# Patient Record
Sex: FEMALE | Race: ASIAN | URBAN - METROPOLITAN AREA
[De-identification: names, ages, dates, MRNs, and addresses within clinical notes are randomized per-mention and may not be internally consistent; named-entity substitution may affect disease eponyms.]

---

## 2017-01-06 LAB
ABO + RH BLD: NORMAL
ABO + RH BLD: NORMAL
HBV SURFACE AG SERPL QL IA: NEGATIVE
HIV 1+2 AB+HIV1 P24 AG SERPL QL IA: NORMAL
RUBELLA ANTIBODY IGG QUANTITATIVE: NORMAL IU/ML
T PALLIDUM IGG SER QL: NORMAL

## 2017-07-07 LAB — GROUP B STREP PCR: NEGATIVE

## 2017-08-06 ENCOUNTER — HOSPITAL ENCOUNTER (INPATIENT)
Facility: CLINIC | Age: 27
LOS: 2 days | Discharge: HOME | End: 2017-08-08
Attending: SPECIALIST | Admitting: OBSTETRICS & GYNECOLOGY
Payer: COMMERCIAL

## 2017-08-06 ENCOUNTER — ANESTHESIA EVENT (OUTPATIENT)
Dept: OBGYN | Facility: CLINIC | Age: 27
End: 2017-08-06
Payer: COMMERCIAL

## 2017-08-06 ENCOUNTER — ANESTHESIA (OUTPATIENT)
Dept: OBGYN | Facility: CLINIC | Age: 27
End: 2017-08-06
Payer: COMMERCIAL

## 2017-08-06 LAB
ABO + RH BLD: NORMAL
ABO + RH BLD: NORMAL
ERYTHROCYTE [DISTWIDTH] IN BLOOD BY AUTOMATED COUNT: 13.5 % (ref 10–15)
HCT VFR BLD AUTO: 38.7 % (ref 35–47)
HGB BLD-MCNC: 13.5 G/DL (ref 11.7–15.7)
MCH RBC QN AUTO: 33 PG (ref 26.5–33)
MCHC RBC AUTO-ENTMCNC: 34.9 G/DL (ref 31.5–36.5)
MCV RBC AUTO: 95 FL (ref 78–100)
PLATELET # BLD AUTO: 143 10E9/L (ref 150–450)
RBC # BLD AUTO: 4.09 10E12/L (ref 3.8–5.2)
SPECIMEN EXP DATE BLD: NORMAL
WBC # BLD AUTO: 11 10E9/L (ref 4–11)

## 2017-08-06 PROCEDURE — 99215 OFFICE O/P EST HI 40 MIN: CPT | Mod: 25

## 2017-08-06 PROCEDURE — 86901 BLOOD TYPING SEROLOGIC RH(D): CPT | Performed by: SPECIALIST

## 2017-08-06 PROCEDURE — 86900 BLOOD TYPING SEROLOGIC ABO: CPT | Performed by: SPECIALIST

## 2017-08-06 PROCEDURE — 3E0R3CZ INTRODUCTION OF REGIONAL ANESTHETIC INTO SPINAL CANAL, PERCUTANEOUS APPROACH: ICD-10-PCS | Performed by: ANESTHESIOLOGY

## 2017-08-06 PROCEDURE — 36415 COLL VENOUS BLD VENIPUNCTURE: CPT | Performed by: SPECIALIST

## 2017-08-06 PROCEDURE — 25000128 H RX IP 250 OP 636: Performed by: ANESTHESIOLOGY

## 2017-08-06 PROCEDURE — 72200001 ZZH LABOR CARE VAGINAL DELIVERY SINGLE

## 2017-08-06 PROCEDURE — 10907ZC DRAINAGE OF AMNIOTIC FLUID, THERAPEUTIC FROM PRODUCTS OF CONCEPTION, VIA NATURAL OR ARTIFICIAL OPENING: ICD-10-PCS | Performed by: OBSTETRICS & GYNECOLOGY

## 2017-08-06 PROCEDURE — 59025 FETAL NON-STRESS TEST: CPT

## 2017-08-06 PROCEDURE — 37000011 ZZH ANESTHESIA WARD SERVICE

## 2017-08-06 PROCEDURE — 00HU33Z INSERTION OF INFUSION DEVICE INTO SPINAL CANAL, PERCUTANEOUS APPROACH: ICD-10-PCS | Performed by: ANESTHESIOLOGY

## 2017-08-06 PROCEDURE — 12000029 ZZH R&B OB INTERMEDIATE

## 2017-08-06 PROCEDURE — 25000125 ZZHC RX 250: Performed by: OBSTETRICS & GYNECOLOGY

## 2017-08-06 PROCEDURE — 85027 COMPLETE CBC AUTOMATED: CPT | Performed by: SPECIALIST

## 2017-08-06 PROCEDURE — 86780 TREPONEMA PALLIDUM: CPT | Performed by: SPECIALIST

## 2017-08-06 PROCEDURE — 25000125 ZZHC RX 250: Performed by: SPECIALIST

## 2017-08-06 PROCEDURE — 25000128 H RX IP 250 OP 636: Performed by: SPECIALIST

## 2017-08-06 PROCEDURE — 0KQM0ZZ REPAIR PERINEUM MUSCLE, OPEN APPROACH: ICD-10-PCS | Performed by: OBSTETRICS & GYNECOLOGY

## 2017-08-06 RX ORDER — OXYTOCIN/0.9 % SODIUM CHLORIDE 30/500 ML
1-24 PLASTIC BAG, INJECTION (ML) INTRAVENOUS CONTINUOUS
Status: DISCONTINUED | OUTPATIENT
Start: 2017-08-06 | End: 2017-08-06

## 2017-08-06 RX ORDER — OXYTOCIN 10 [USP'U]/ML
10 INJECTION, SOLUTION INTRAMUSCULAR; INTRAVENOUS
Status: COMPLETED | OUTPATIENT
Start: 2017-08-06 | End: 2017-08-06

## 2017-08-06 RX ORDER — NALBUPHINE HYDROCHLORIDE 10 MG/ML
2.5-5 INJECTION, SOLUTION INTRAMUSCULAR; INTRAVENOUS; SUBCUTANEOUS EVERY 6 HOURS PRN
Status: DISCONTINUED | OUTPATIENT
Start: 2017-08-06 | End: 2017-08-06

## 2017-08-06 RX ORDER — ROPIVACAINE HYDROCHLORIDE 2 MG/ML
10 INJECTION, SOLUTION EPIDURAL; INFILTRATION; PERINEURAL ONCE
Status: DISCONTINUED | OUTPATIENT
Start: 2017-08-06 | End: 2017-08-06

## 2017-08-06 RX ORDER — NALOXONE HYDROCHLORIDE 0.4 MG/ML
.1-.4 INJECTION, SOLUTION INTRAMUSCULAR; INTRAVENOUS; SUBCUTANEOUS
Status: DISCONTINUED | OUTPATIENT
Start: 2017-08-06 | End: 2017-08-08 | Stop reason: HOSPADM

## 2017-08-06 RX ORDER — BISACODYL 10 MG
10 SUPPOSITORY, RECTAL RECTAL DAILY PRN
Status: DISCONTINUED | OUTPATIENT
Start: 2017-08-08 | End: 2017-08-08 | Stop reason: HOSPADM

## 2017-08-06 RX ORDER — MISOPROSTOL 200 UG/1
400 TABLET ORAL
Status: DISCONTINUED | OUTPATIENT
Start: 2017-08-06 | End: 2017-08-08 | Stop reason: HOSPADM

## 2017-08-06 RX ORDER — CARBOPROST TROMETHAMINE 250 UG/ML
250 INJECTION, SOLUTION INTRAMUSCULAR
Status: COMPLETED | OUTPATIENT
Start: 2017-08-06 | End: 2017-08-06

## 2017-08-06 RX ORDER — OMEGA-3-ACID ETHYL ESTERS 1 G/1
2 CAPSULE, LIQUID FILLED ORAL 2 TIMES DAILY
COMMUNITY

## 2017-08-06 RX ORDER — ACETAMINOPHEN 325 MG/1
650 TABLET ORAL EVERY 4 HOURS PRN
Status: DISCONTINUED | OUTPATIENT
Start: 2017-08-06 | End: 2017-08-06

## 2017-08-06 RX ORDER — OXYTOCIN/0.9 % SODIUM CHLORIDE 30/500 ML
100-340 PLASTIC BAG, INJECTION (ML) INTRAVENOUS CONTINUOUS PRN
Status: COMPLETED | OUTPATIENT
Start: 2017-08-06 | End: 2017-08-06

## 2017-08-06 RX ORDER — LANOLIN 100 %
OINTMENT (GRAM) TOPICAL
Status: DISCONTINUED | OUTPATIENT
Start: 2017-08-06 | End: 2017-08-08 | Stop reason: HOSPADM

## 2017-08-06 RX ORDER — OXYTOCIN/0.9 % SODIUM CHLORIDE 30/500 ML
100 PLASTIC BAG, INJECTION (ML) INTRAVENOUS CONTINUOUS
Status: DISCONTINUED | OUTPATIENT
Start: 2017-08-06 | End: 2017-08-08 | Stop reason: HOSPADM

## 2017-08-06 RX ORDER — EPHEDRINE SULFATE 50 MG/ML
5 INJECTION, SOLUTION INTRAMUSCULAR; INTRAVENOUS; SUBCUTANEOUS
Status: DISCONTINUED | OUTPATIENT
Start: 2017-08-06 | End: 2017-08-06

## 2017-08-06 RX ORDER — NALOXONE HYDROCHLORIDE 0.4 MG/ML
.1-.4 INJECTION, SOLUTION INTRAMUSCULAR; INTRAVENOUS; SUBCUTANEOUS
Status: DISCONTINUED | OUTPATIENT
Start: 2017-08-06 | End: 2017-08-06

## 2017-08-06 RX ORDER — LIDOCAINE 40 MG/G
CREAM TOPICAL
Status: DISCONTINUED | OUTPATIENT
Start: 2017-08-06 | End: 2017-08-06

## 2017-08-06 RX ORDER — AMOXICILLIN 250 MG
1-2 CAPSULE ORAL 2 TIMES DAILY
Status: DISCONTINUED | OUTPATIENT
Start: 2017-08-06 | End: 2017-08-08 | Stop reason: HOSPADM

## 2017-08-06 RX ORDER — OXYTOCIN/0.9 % SODIUM CHLORIDE 30/500 ML
340 PLASTIC BAG, INJECTION (ML) INTRAVENOUS CONTINUOUS PRN
Status: DISCONTINUED | OUTPATIENT
Start: 2017-08-06 | End: 2017-08-08 | Stop reason: HOSPADM

## 2017-08-06 RX ORDER — BUPIVACAINE HYDROCHLORIDE 2.5 MG/ML
INJECTION, SOLUTION EPIDURAL; INFILTRATION; INTRACAUDAL
Status: DISCONTINUED
Start: 2017-08-06 | End: 2017-08-07 | Stop reason: HOSPADM

## 2017-08-06 RX ORDER — IBUPROFEN 800 MG/1
800 TABLET, FILM COATED ORAL
Status: DISCONTINUED | OUTPATIENT
Start: 2017-08-06 | End: 2017-08-06

## 2017-08-06 RX ORDER — OXYCODONE AND ACETAMINOPHEN 5; 325 MG/1; MG/1
1 TABLET ORAL
Status: DISCONTINUED | OUTPATIENT
Start: 2017-08-06 | End: 2017-08-06

## 2017-08-06 RX ORDER — OXYTOCIN 10 [USP'U]/ML
10 INJECTION, SOLUTION INTRAMUSCULAR; INTRAVENOUS
Status: DISCONTINUED | OUTPATIENT
Start: 2017-08-06 | End: 2017-08-08 | Stop reason: HOSPADM

## 2017-08-06 RX ORDER — SODIUM CHLORIDE, SODIUM LACTATE, POTASSIUM CHLORIDE, CALCIUM CHLORIDE 600; 310; 30; 20 MG/100ML; MG/100ML; MG/100ML; MG/100ML
INJECTION, SOLUTION INTRAVENOUS CONTINUOUS
Status: DISCONTINUED | OUTPATIENT
Start: 2017-08-06 | End: 2017-08-06

## 2017-08-06 RX ORDER — METHYLERGONOVINE MALEATE 0.2 MG/ML
200 INJECTION INTRAVENOUS
Status: DISCONTINUED | OUTPATIENT
Start: 2017-08-06 | End: 2017-08-06

## 2017-08-06 RX ORDER — ONDANSETRON 2 MG/ML
4 INJECTION INTRAMUSCULAR; INTRAVENOUS EVERY 6 HOURS PRN
Status: DISCONTINUED | OUTPATIENT
Start: 2017-08-06 | End: 2017-08-06

## 2017-08-06 RX ORDER — IBUPROFEN 600 MG/1
600 TABLET, FILM COATED ORAL EVERY 6 HOURS PRN
Status: DISCONTINUED | OUTPATIENT
Start: 2017-08-06 | End: 2017-08-08 | Stop reason: HOSPADM

## 2017-08-06 RX ORDER — PRENATAL VIT/IRON FUM/FOLIC AC 27MG-0.8MG
1 TABLET ORAL DAILY
COMMUNITY

## 2017-08-06 RX ORDER — HYDROCORTISONE 2.5 %
CREAM (GRAM) TOPICAL 3 TIMES DAILY PRN
Status: DISCONTINUED | OUTPATIENT
Start: 2017-08-06 | End: 2017-08-08 | Stop reason: HOSPADM

## 2017-08-06 RX ORDER — FENTANYL CITRATE 50 UG/ML
100 INJECTION, SOLUTION INTRAMUSCULAR; INTRAVENOUS ONCE
Status: DISCONTINUED | OUTPATIENT
Start: 2017-08-06 | End: 2017-08-06

## 2017-08-06 RX ORDER — ACETAMINOPHEN 325 MG/1
975 TABLET ORAL EVERY 6 HOURS PRN
Status: DISCONTINUED | OUTPATIENT
Start: 2017-08-06 | End: 2017-08-08 | Stop reason: HOSPADM

## 2017-08-06 RX ADMIN — SODIUM CHLORIDE, POTASSIUM CHLORIDE, SODIUM LACTATE AND CALCIUM CHLORIDE 1000 ML: 600; 310; 30; 20 INJECTION, SOLUTION INTRAVENOUS at 07:10

## 2017-08-06 RX ADMIN — CARBOPROST TROMETHAMINE 250 MCG: 250 INJECTION, SOLUTION INTRAMUSCULAR at 21:56

## 2017-08-06 RX ADMIN — LIDOCAINE HYDROCHLORIDE 20 ML: 10 INJECTION, SOLUTION INFILTRATION; PERINEURAL at 22:00

## 2017-08-06 RX ADMIN — Medication 800 TABLET: at 22:05

## 2017-08-06 RX ADMIN — SODIUM CHLORIDE, POTASSIUM CHLORIDE, SODIUM LACTATE AND CALCIUM CHLORIDE: 600; 310; 30; 20 INJECTION, SOLUTION INTRAVENOUS at 13:27

## 2017-08-06 RX ADMIN — Medication 12 ML/HR: at 08:28

## 2017-08-06 RX ADMIN — OXYTOCIN-SODIUM CHLORIDE 0.9% IV SOLN 30 UNIT/500ML 2 MILLI-UNITS/MIN: 30-0.9/5 SOLUTION at 17:20

## 2017-08-06 RX ADMIN — OXYTOCIN-SODIUM CHLORIDE 0.9% IV SOLN 30 UNIT/500ML 340 ML/HR: 30-0.9/5 SOLUTION at 22:00

## 2017-08-06 RX ADMIN — FENTANYL CITRATE 100 MCG: 50 INJECTION, SOLUTION INTRAMUSCULAR; INTRAVENOUS at 08:12

## 2017-08-06 RX ADMIN — Medication 100 ML/HR: at 22:30

## 2017-08-06 RX ADMIN — OXYTOCIN-SODIUM CHLORIDE 0.9% IV SOLN 30 UNIT/500ML 10 MILLI-UNITS/MIN: 30-0.9/5 SOLUTION at 21:00

## 2017-08-06 RX ADMIN — OXYTOCIN 10 UNITS: 10 INJECTION, SOLUTION INTRAMUSCULAR; INTRAVENOUS at 21:58

## 2017-08-06 RX ADMIN — SODIUM CHLORIDE, POTASSIUM CHLORIDE, SODIUM LACTATE AND CALCIUM CHLORIDE: 600; 310; 30; 20 INJECTION, SOLUTION INTRAVENOUS at 07:55

## 2017-08-06 RX ADMIN — ROPIVACAINE HYDROCHLORIDE 10 ML: 2 INJECTION, SOLUTION EPIDURAL; INFILTRATION at 08:12

## 2017-08-06 NOTE — IP AVS SNAPSHOT
MRN:[id number]                      After Visit Summary   8/6/2017    Bridgett Gillespie    MRN: [id number]           Thank you!     Thank you for choosing [facility] for your care. Our goal is always to provide you with excellent care. Hearing back from our patients is one way we can continue to improve our services. Please take a few minutes to complete the written survey that you may receive in the mail after you visit with us. Thank you!        Patient Information     Date Of Birth          1990        About your hospital stay     You were admitted on:  August 6, 2017 You last received care in the:  [address]    You were discharged on:  August 8, 2017       Who to Call     For medical emergencies, please call 911.  For non-urgent questions about your medical care, please call your primary care provider or clinic, [phone]          Attending Provider     Provider Specialty    Lissette Londono MD OB/Gyn    Buck, Michelle Cody MD OB/Gyn       Primary Care Provider Office Phone # Fax #    Holley Jeremias Al [phone]      After Care Instructions     Activity       Review discharge instructions            Diet       Resume previous diet            Discharge Instructions - Postpartum visit       Schedule postpartum visit with your provider and return to clinic in 2 weeks.  Take iron supplements BID, docusate, use TUCKs as needed                  Further instructions from your care team       Postpartum Vaginal Delivery Instructions    Activity       Ask family and friends for help when you need it.    Do not place anything in your vagina for 6 weeks.    You are not restricted on other activities, but take it easy for a few weeks to allow your body to recover from delivery.  You are able to do any activities you feel up to that point.    No driving until you have stopped taking your pain medications (usually two weeks after  delivery).     Call your health care provider if you have any of these symptoms:       Increased pain, swelling, redness, or fluid around your stiches from an episiotomy or perineal tear.    A fever above 100.4 F (38 C) with or without chills when placing a thermometer under your tongue.    You soak a sanitary pad with blood within 1 hour, or you see blood clots larger than a golf ball.    Bleeding that lasts more than 6 weeks.    Vaginal discharge that smells bad.    Severe pain, cramping or tenderness in your lower belly area.    A need to urinate more frequently (use the toilet more often), more urgently (use the toilet very quickly), or it burns when you urinate.    Nausea and vomiting.    Redness, swelling or pain around a vein in your leg.    Problems breastfeeding or a red or painful area on your breast.    Chest pain and cough or are gasping for air.    Problems coping with sadness, anxiety, or depression.  If you have any concerns about hurting yourself or the baby, call your provider immediately.     You have questions or concerns after you return home.     Keep your hands clean:  Always wash your hands before touching your perineal area and stitches.  This helps reduce your risk of infection.  If your hands aren't dirty, you may use an alcohol hand-rub to clean your hands. Keep your nails clean and short.        Pending Results     Date and Time Order Name Status Description    8/7/2017 1125 EKG 12-lead, tracing only Preliminary             Statement of Approval     Ordered          08/08/17 0814  I have reviewed and agree with all the recommendations and orders detailed in this document.  EFFECTIVE NOW     Approved and electronically signed by:  Holley Al MD             Admission Information     Date & Time Provider Department Dept. Phone    8/6/2017 Michelle Nunez MD [address] [phone]      Your Vitals Were     Blood Pressure Pulse Temperature  "Respirations Pulse Oximetry       99/64 (BP Location: Right arm) 117 97.2  F (36.2  C) (Oral) 16 96%       MyChart Information     [facility] lets you send messages to your doctor, view your test results, renew your prescriptions, schedule appointments and more. To sign up, go to [email or web address] . Click on \"Log in\" on the left side of the screen, which will take you to the Welcome page. Then click on \"Sign up Now\" on the right side of the page.     You will be asked to enter the access code listed below, as well as some personal information. Please follow the directions to create your username and password.     Your access code is: [email or web address]  Expires: 2017  1:28 PM     Your access code will  in 90 days. If you need help or a new code, please call your [facility] clinic or [phone].        Care EveryWhere ID     This is your Care EveryWhere ID. This could be used by other organizations to access your [facility] medical records  [id number]        Equal Access to Services     [facility] : Hadii rubio Lozano, wajames ahmadi, qasuyapa rodríguezalfelisa man, cammie davey . So [facility] [place] [phone].    ATENCIÓN: Si habla español, tiene a teran disposición servicios gratuitos de asistencia lingüística. Kelby al [phone].    We comply with applicable federal civil rights laws and [place] laws. We do not discriminate on the basis of race, color, national origin, age, disability sex, sexual orientation or gender identity.               Review of your medicines      CONTINUE these medicines which have NOT CHANGED        Dose / Directions    omega-3 acid ethyl esters 1 G capsule   Commonly known as:  Lovaza        Dose:  2 g   Take 2 g by mouth 2 times daily   Refills:  0       prenatal multivitamin plus iron 27-0.8 MG Tabs per tablet   Indication:  Pregnancy        Dose:  1 tablet   Take 1 tablet by mouth daily   Refills:  0       VITAMIN D (CHOLECALCIFEROL) PO        " Dose:  2000 Units   Take 2,000 Units by mouth daily   Refills:  0                Protect others around you: Learn how to safely use, store and throw away your medicines at www.disposemymeds.org.             Medication List: This is a list of all your medications and when to take them. Check marks below indicate your daily home schedule. Keep this list as a reference.      Medications           Morning Afternoon Evening Bedtime As Needed    omega-3 acid ethyl esters 1 G capsule   Commonly known as:  Lovaza   Take 2 g by mouth 2 times daily                                prenatal multivitamin plus iron 27-0.8 MG Tabs per tablet   Take 1 tablet by mouth daily                                VITAMIN D (CHOLECALCIFEROL) PO   Take 2,000 Units by mouth daily

## 2017-08-06 NOTE — IP AVS SNAPSHOT
[address]    [place] [address]    Phone:  [phone]                                       After Visit Summary   8/6/2017    Bridgett Gillespie    MRN: [id number]           After Visit Summary Signature Page     I have received my discharge instructions, and my questions have been answered. I have discussed any challenges I see with this plan with the nurse or doctor.    ..........................................................................................................................................  Patient/Patient Representative Signature      ..........................................................................................................................................  Patient Representative Print Name and Relationship to Patient    ..................................................               ................................................  Date                                            Time    ..........................................................................................................................................  Reviewed by Signature/Title    ...................................................              ..............................................  Date                                                            Time

## 2017-08-06 NOTE — PLAN OF CARE
0615: Primip, 40+2 weeks, presents to [facility] for labor evaluation. She has been rogelio every 15 minutes since last evening, with back pain. Denies bleeding or leaking of fluid. Patient speaks english- but primary language is Patricio- declines  at this time. Uncomplicated pregnancy, GBS negative. Monitors applied.   0645: Dr. Londono updated by phone re: contractions every 5-6 minutes, SVE with good change from the office exam, bloody show, intact. Discussed patient's tachycardia- no cardiac history. Will monitor with pulse ox to distinguish between FHT's.   0655: To room #231  0715: Report to JUAN JOSE Barrientos RN. #18g IV started in left hand.

## 2017-08-06 NOTE — H&P
OB ADMISSION NOTE    CHIEF COMPLAINT: Painful contractions  Active Problems:    Indication for care in labor or delivery      OBSTETRICAL / DATING HISTORY:  Estimated Date of Delivery: Aug 4, 2017  Gestational Age:  40w2d    Obstetric History       T0      L0     SAB0   TAB0   Ectopic0   Multiple0   Live Births0       # Outcome Date GA Lbr Ken/2nd Weight Sex Delivery Anes PTL Lv   2 Current            1 SAB  12w0d                     PAST MEDICAL HISTORY:  History reviewed. No pertinent past medical history.     PAST SURGICAL HISTORY:  History reviewed. No pertinent surgical history.     FAMILY HISTORY:  No family history on file.      ALLERGIES:   No Known Allergies     HABITS:  Social History     Social History     Marital status:      Spouse name: N/A     Number of children: N/A     Years of education: N/A     Occupational History     Not on file.     Social History Main Topics     Smoking status: Never Smoker     Smokeless tobacco: Never Used     Alcohol use No     Drug use: No     Sexual activity: Not on file     Other Topics Concern     Not on file     Social History Narrative        HISTORY OF PRESENT ILLNESS:    (Please see scanned  sheets for prenatal history. Examination at the time of admission revealed no interval change in the patient s history or physical exam except as described below.)    26 year old  at 40w2d presented in active labor. Now comfortable with epidural.     Uncomplicated pregnancy, otherwise healthy    REVIEW OF SYSTEMS:  Negative except per HPI    PHYSICAL EXAM:   Patient Vitals for the past 8 hrs:   BP Temp Resp SpO2   17 0915 125/78 - 18 99 %   17 0900 122/76 97.5  F (36.4  C) - 100 %   17 0840 130/85 - - 100 %   17 0830 125/77 - 18 100 %   17 0820 132/80 - 18 100 %   17 0800 - - 18 99 %   17 0730 132/82 98.8  F (37.1  C) 18 100 %   17 0715 134/87 - 18 99 %   17 0615 124/83 98.1  F (36.7  C) -  -     Gen: NAD, lying in bed  Abd: gravid    SVE: 90/0  Membrane Status: AROM to small amount fluid mixed with bloody show  Fetal Presentation: cephalic   EFW: 7.5 lbs    EFM & [place]: category 1, contractions q2-4min    Recent Labs   Lab Test  17   0850  07/08/15   0852   HGB  13.5  14.1   PLT  143*  236       Assessment/Plan: 26 year old  at 40w2d here in labor  -admit to L&D  -expectant management, AROM'd at 0940  -pain management: comfortable with epidural  -maternal tachycardia: monitor  Dispo: pending course      Michelle Nunez   2017   Pager: [phone]

## 2017-08-07 LAB
ANION GAP SERPL CALCULATED.3IONS-SCNC: 9 MMOL/L (ref 3–14)
BUN SERPL-MCNC: 4 MG/DL (ref 7–30)
CALCIUM SERPL-MCNC: 8.4 MG/DL (ref 8.5–10.1)
CHLORIDE SERPL-SCNC: 108 MMOL/L (ref 94–109)
CO2 SERPL-SCNC: 24 MMOL/L (ref 20–32)
CREAT SERPL-MCNC: 0.52 MG/DL (ref 0.52–1.04)
GFR SERPL CREATININE-BSD FRML MDRD: ABNORMAL ML/MIN/1.7M2
GLUCOSE SERPL-MCNC: 111 MG/DL (ref 70–99)
HGB BLD-MCNC: 10.1 G/DL (ref 11.7–15.7)
HGB BLD-MCNC: 8.6 G/DL (ref 11.7–15.7)
POTASSIUM SERPL-SCNC: 3.2 MMOL/L (ref 3.4–5.3)
SODIUM SERPL-SCNC: 141 MMOL/L (ref 133–144)
T PALLIDUM IGG+IGM SER QL: NEGATIVE
TSH SERPL DL<=0.005 MIU/L-ACNC: 3.14 MU/L (ref 0.4–4)

## 2017-08-07 PROCEDURE — 85018 HEMOGLOBIN: CPT | Performed by: PHYSICIAN ASSISTANT

## 2017-08-07 PROCEDURE — 84443 ASSAY THYROID STIM HORMONE: CPT | Performed by: SPECIALIST

## 2017-08-07 PROCEDURE — 93005 ELECTROCARDIOGRAM TRACING: CPT

## 2017-08-07 PROCEDURE — 12000037 ZZH R&B POSTPARTUM INTERMEDIATE

## 2017-08-07 PROCEDURE — 25000128 H RX IP 250 OP 636: Performed by: PHYSICIAN ASSISTANT

## 2017-08-07 PROCEDURE — 80048 BASIC METABOLIC PNL TOTAL CA: CPT | Performed by: PHYSICIAN ASSISTANT

## 2017-08-07 PROCEDURE — 93010 ELECTROCARDIOGRAM REPORT: CPT | Performed by: INTERNAL MEDICINE

## 2017-08-07 PROCEDURE — 99207 ZZC CONSULT E&M CHANGED TO INITIAL LEVEL: CPT | Performed by: PHYSICIAN ASSISTANT

## 2017-08-07 PROCEDURE — 36415 COLL VENOUS BLD VENIPUNCTURE: CPT | Performed by: SPECIALIST

## 2017-08-07 PROCEDURE — 99222 1ST HOSP IP/OBS MODERATE 55: CPT | Performed by: PHYSICIAN ASSISTANT

## 2017-08-07 PROCEDURE — 85018 HEMOGLOBIN: CPT | Performed by: OBSTETRICS & GYNECOLOGY

## 2017-08-07 PROCEDURE — 36415 COLL VENOUS BLD VENIPUNCTURE: CPT | Performed by: PHYSICIAN ASSISTANT

## 2017-08-07 PROCEDURE — 25000132 ZZH RX MED GY IP 250 OP 250 PS 637: Performed by: OBSTETRICS & GYNECOLOGY

## 2017-08-07 RX ORDER — FENTANYL CITRATE 50 UG/ML
INJECTION, SOLUTION INTRAMUSCULAR; INTRAVENOUS PRN
Status: DISCONTINUED | OUTPATIENT
Start: 2017-08-06 | End: 2017-08-07 | Stop reason: HOSPADM

## 2017-08-07 RX ORDER — ROPIVACAINE HYDROCHLORIDE 2 MG/ML
INJECTION, SOLUTION EPIDURAL; INFILTRATION; PERINEURAL PRN
Status: DISCONTINUED | OUTPATIENT
Start: 2017-08-06 | End: 2017-08-07 | Stop reason: HOSPADM

## 2017-08-07 RX ADMIN — SENNOSIDES AND DOCUSATE SODIUM 1 TABLET: 8.6; 5 TABLET ORAL at 20:06

## 2017-08-07 RX ADMIN — SENNOSIDES AND DOCUSATE SODIUM 1 TABLET: 8.6; 5 TABLET ORAL at 09:29

## 2017-08-07 RX ADMIN — IBUPROFEN 600 MG: 600 TABLET ORAL at 17:34

## 2017-08-07 RX ADMIN — IBUPROFEN 600 MG: 600 TABLET ORAL at 09:30

## 2017-08-07 RX ADMIN — SODIUM CHLORIDE 1000 ML: 9 INJECTION, SOLUTION INTRAVENOUS at 13:37

## 2017-08-07 NOTE — CONSULTS
PRIMARY CARE  PHYSICIAN:  Holley Al MD      REASON FOR CONSULTATION:  Persistent tachycardia.      PHYSICIAN REQUESTING CONSULTATION:  Dr. Castelan.      HISTORY OF PRESENT ILLNESS:  Bridgett Gillespie is a 26-year-old otherwise healthy female who is 1 day postpartum from delivering a healthy.  The patient had an uneventful and uncomplicated pregnancy.  She was noted to be tachycardic on arrival to a heart rate of 140 in sinus rhythm.  Postpartum she has been persistently tachycardic to a range of 115-120 beats per minute and therefore hospitalist consultation was requested.      The patient is presently evaluated in hospital room with family at bedside who assists with history.  She presently denies any shortness of breath, difficulty breathing, chest pain, chest discomfort, no nausea, no vomiting, no indigestion, no abdominal pain or discomfort.  She has not had any fevers.  She has been ambulating around the room with minimal difficulty.  Given that she is 1 day postpartum, she does have still some irritation in the perineal area, but she is managing well.  She has not required a large amount of narcotic pain medication.  Per nursing staff, she is not bleeding a worrisome amount.      PAST MEDICAL HISTORY:   1.  History of vitamin D deficiency.   2.  Spontaneous  at 12 weeks.      PAST SURGICAL HISTORY:  None.      FAMILY HISTORY:  Reviewed and noncontributory.  No family history that she knows of blood clots in the family.      SOCIAL HISTORY:  The patient currently lives with her .  She does not smoke tobacco.  She does not consume alcohol on a regular basis.  She does eat a primarily vegetarian diet.      REVIEW OF SYSTEMS:  A 10-point review of systems was performed and is otherwise negative.  Please refer to the HPI.      PRIOR TO ADMISSION MEDICATIONS:    Prescriptions Prior to Admission   Medication Sig Dispense Refill Last Dose     Prenatal Vit-Fe Fumarate-FA (PRENATAL MULTIVITAMIN PLUS IRON)  27-0.8 MG TABS per tablet Take 1 tablet by mouth daily   8/5/2017 at Unknown time     omega-3 acid ethyl esters (LOVAZA) 1 G capsule Take 2 g by mouth 2 times daily   8/5/2017 at Unknown time     VITAMIN D, CHOLECALCIFEROL, PO Take 2,000 Units by mouth daily   8/5/2017 at Unknown time         ALLERGIES:  No known drug allergies.      PHYSICAL EXAMINATION:   VITAL SIGNS:  Temperature 98.1, heart rate of 117, respiratory rate of 18, blood pressure of 99/65, SpO2 of 96% on room air.   GENERAL:  A well-developed, well-nourished female who appears comfortable.   HEENT:  Head is normocephalic.  EOMs are intact bilaterally.  Nose and mouth are patent.  Mucous membranes are moist.   LUNGS:  Clear to auscultation bilaterally without wheezes or crackles or increased work of breathing.   HEART:  Tachycardic, normal S1, S2.  There is no murmur.   ABDOMEN:  Gravid, nontender, no guarding, no rigidity.     EXTREMITIES:  Patient is spontaneously moving bilateral upper and lower extremities.  Gait is normal.  Radial and pedal pulses are 2+ bilaterally.  She does not have any pedal edema.      LABORATORY DATA AND IMAGING:  Hemoglobin of 10.1.  BMP with sodium 141, potassium 3.2, chloride 108, bicarbonate 24, BUN 4, creatinine 0.52, calcium 8.4.  TSH is 3.14.      ASSESSMENT AND PLAN:  Bridgett Gillespie is a 26-year-old female, postpartum day #1 for delivery of the infant, who hospitalist was requested to see for persistent tachycardia in the postpartum period.   1.  Tachycardia.  Differential is broad in postpartum individual, includes infection, PE, amniotic fluid embolus, acute blood loss, ACS. EKG was performed which indicates sinus tachycardia without ST or T-wave changes.  The patient is presently asymptomatic without chest pain, chest discomfort, shortness of breath, hypoxia to suggest amniotic fluid or pulmonary embolism.  As patient is asymptomatic, most likely etiology is related to volume depletion as evidenced by acute anemia  and associated soft blood pressures.  Will administer 1 liter of IV fluids this afternoon, repeat hemoglobin this evening to ensure no ongoing bleeding is occurring.  Patient presently does not appear to be infectious as she is afebrile and without leukocytosis or other concerning symptoms.   2.  Acute blood loss anemia.  Hemoglobin of 13.5 to 10.1 in the immediate postpartum period.  Continue to monitor.   3.  Deep venous thrombosis prophylaxis:  Ambulation.      CODE STATUS:  The patient is full code.      The patient was staffed with Dr. Kwaku Butterfield who independently interviewed and evaluated patient and is agreement with the above-mentioned plan.         KWAKU BUTTERFIELD MD       As dictated by BJORN GARDNER PA-C            D: 2017 12:54   T: 2017 14:00   MT: PAULA      Name:     HUMBERTO HO   MRN:      [id number]        Account:       [id number]   :      1990           Consult Date:  2017      Document: [id number]       cc: Sandra Al MD

## 2017-08-07 NOTE — PROVIDER NOTIFICATION
08/07/17 1832   Provider Notification   Provider Name/Title Dr. Castelan   Method of Notification Phone   Request Evaluate-Remote   Notification Reason Lab Results     Phone call to Dr. Castelan to evaluate hgb result of 8.6 this evening.  Patient remains asymptomatic other than her tachycardia.  Per Dr. Castelan this is probably dilutional from her bolus today and we will plan to wait and watch.  Hgb lab previously ordered for 0600 tomorrow.

## 2017-08-07 NOTE — ANESTHESIA POSTPROCEDURE EVALUATION
Patient: Bridgett Gillespie    * No procedures listed *    Diagnosis:* No pre-op diagnosis entered *  Diagnosis Additional Information: No value filed.    Anesthesia Type:  No value filed.    Note:  Anesthesia Post Evaluation    Patient location during evaluation: Floor  Patient participation: Able to fully participate in evaluation  Level of consciousness: awake and alert  Pain management: adequate  Airway patency: patent  Cardiovascular status: acceptable  Respiratory status: acceptable  Hydration status: acceptable  PONV: none     Anesthetic complications: None          Last vitals:  Vitals:    08/06/17 2315 08/06/17 2330 08/06/17 2345   BP: 132/87 129/82 129/78   Resp:      Temp:      SpO2:            Electronically Signed By: Colin Perez MD  August 7, 2017  1:10 AM

## 2017-08-07 NOTE — PLAN OF CARE
Problem: Goal Outcome Summary  Goal: Goal Outcome Summary  Outcome: No Change  Peanut ball used for position, pt. Stated her hips hurt with large peanut ball and so the smaller one was offered, which patient states decreases hip pain.  Frequent position change.   FSE, IUPC and Pitocin started.  Tolerated well.  See flowsheet.  Patient and family educated on risk of IUPC and FSE, verbalized agreement as well as with Pitocin use to help increase her labor pattern.    Continue to monitor.

## 2017-08-07 NOTE — PLAN OF CARE
Data: Bridgett Gillespie transferred to 412 via wheelchair at 0020. Baby transferred via parent's arms.  Action: Receiving unit notified of transfer: Yes. Patient and family notified of room change. Report given to ALPHONSE Humphrey RN at 0025. Belongings sent to receiving unit. Accompanied by Registered Nurse. Oriented patient to surroundings. Call light within reach. ID bands double-checked with receiving RN.  Response: Patient tolerated transfer and is stable.

## 2017-08-07 NOTE — PLAN OF CARE
Problem: Goal Outcome Summary  Goal: Goal Outcome Summary  Outcome: Improving  Patient arrived to the unit with baby in arms and  by her side. They were oriented to the room and safety education was completed. Both verbalized understanding. Vital signs stable. Patient voiding without difficulty. Bleeding has been minimal. Able to ambulate in room free of dizziness. Denies the need for pain medication. Working on breastfeeding infant every 2-3 hours. Encouraged to call with questions/concerns. Will continue to monitor.

## 2017-08-07 NOTE — LACTATION NOTE
Initial Lactation visit. Hand out given. Recommend unlimited, frequent breast feedings: At least 8 - 12 times every 24 hours. Avoid pacifiers and supplementation with formula unless medically indicated. Explained benefits of holding baby skin on skin to help promote better breastfeeding outcomes. Will revisit as needed.    Gardenia Ramon RN IBCLC

## 2017-08-07 NOTE — PLAN OF CARE
Problem: Goal Outcome Summary  Goal: Goal Outcome Summary  Outcome: No Change   notified of persistent tachycardia,hospitalist consult ordered,Aftab MUNOZ came to see patient,EKG&lab work done,I/V Normal saline bolus given,voiding with out difficulty,pain control with ibuprofen,up independent,denies light headedness,headache,shortness of breath.Working on breast feeding.Will recheck hgb at 1800& in AM.Will continue to monitor.

## 2017-08-07 NOTE — L&D DELIVERY NOTE
Delivery Summary    Bridgett Gillespie MRN# [id number]   Age: 26 year old YOB: 1990     ASSESSMENT & PLAN:   26 year old  at 40w2d presented in labor. She received a labor epidural and had artifical rupture of membranes. She had slow progress and Pitocin augmentation. She progressed to complete and had a spontaneous vaginal delivery of a vigorous male infant. Nuchal cord x 1 reduced after delivery of the head. Terminal meconium noted. Apgars 8 and 9 at 1 and 5 minutes respectively. Weight 8lbs 12oz.   Placenta delivered over gentle downward traction on 3 vessel cord and suprapubic pressure. It was inspected and noted to be intact.   Brisk bleeding was noted that slowed with manual massage but required IM carboprost, IM pitocin (IV infiltrated), IV pitocin after IV replaced, rectal misoprostol, and bladder drained.   Small midline 2nd degree laceration repaired in standard fashion with 3-0 Vicryl.        Labor Event Times    Labor onset date:  17    Dilation complete date:  17 Complete time:   8:20 PM   Start pushing date/time:  2017            Labor Events     labor?:  No    steroids:  None   Labor Type:  Spontaneous      Antibiotics received during labor?:  No      Rupture identifier:  Rupture 1   Rupture date/time: 17 0940   Rupture type:  Artificial Rupture of Membranes   Fluid color:  Clear   Fluid odor:  Normal      1:1 continuous labor support provided by?:  RN          Delivery/Placenta Date and Time    Delivery Date:  17 Delivery Time:   9:44 PM   Placenta Date/Time:  2017  9:50 PM   Oxytocin given at the time of delivery:  after delivery of placenta      Vaginal Counts    Initial count performed by 2 team members:   Two Team Members   Hassel   Nunez          Needles Suture [place] Sponges [employer]   Initial counts 2 0 5    Added to count 0 1 0    Final counts 2 1 5       Placed during labor Accounted for at the end of labor   Yes Yes   Yes Yes  "  No NA      Final count performed by 2 team members:   Two Team Members   Jeff Nunez         Final count correct?:  Yes         Apgars    Living status:  Living    1 Minute 5 Minute 10 Minute 15 Minute 20 Minute   Skin color: 0  1       Heart rate: 2  2       Reflex irritability: 2  2       Muscle tone: 2  2       Respiratory effort: 2  2       Total: 8  9          Apgars assigned by:  HUSSEIN FRANCO      Cord    Vessels:  3 Vessels Complications:  Nuchal   Cord Blood Disposition:  Lab Gases Sent?:  No          Resuscitation       Output in Delivery Room:  Stool      [place] Measurements    Weight:  139.7 oz Length:  21.5\"   Head circumference:  0.349 m       Labor Events and Shoulder Dystocia    Fetal Tracing Prior to Delivery:  Category 2   Shoulder dystocia present?:  Neg            Delivery (Maternal) (Provider to Complete) (632457)    Episiotomy:  None   Perineal lacerations:  2nd    Periurethral laceration:  right    Est. blood loss (mL):  800         Mother's Information  Mother: Bridgett Gillespie #[id number]    Start of Mother's Information     IO Blood Loss  17 0000 - 17 2229    Mom's I/O Activity            End of Mother's Information  Mother: Bridgett Gillespie #[id number]            Delivery - Provider to Complete ([id number])    Delivering clinician:  MICHELLE NUNEZ   Attempted Delivery Types (Choose all that apply):  Spontaneous Vaginal Delivery   Delivery Type (Choose the 1 that will go to the Birth History):  Vaginal, Spontaneous Delivery                           Placenta    Delayed Cord Clamping:  Done   Date/Time:  2017  9:50 PM   Removal:  Spontaneous   Disposition:  Hospital disposal      Anesthesia    Method:  Epidural, Local   Cervical dilation at placement:  4-7         Presentation and Position    Presentation:  Vertex   Position:  Right Occiput Anterior                    Michelle Nunez MD   2017   "

## 2017-08-07 NOTE — PROGRESS NOTES
POSTPARTUM NOTE, VAGINAL DELIVERY    POSTPARTUM DAY # 1  No complaints.    /69  Temp 97.9  F (36.6  C) (Oral)  Resp 16  SpO2 96%  Breastfeeding? Unknown,   Hemoglobin   Date Value Ref Range Status   08/06/2017 13.5 11.7 - 15.7 g/dL Final       Intake/Output Summary (Last 24 hours) at 08/07/17 0793  Last data filed at 08/06/17 2144   Gross per 24 hour   Intake                0 ml   Output             2900 ml   Net            -2900 ml         Alert and oriented  Extremities-not edematous, non tender    Doing well, s/p vaginal delivery.  Persistent tachycardia.    Assessment/Plan-  hgb and TSH today.  If labs normal and no resolution of tachycardia, consider consult from the hospitalist.  All questions answered to patient's satisfaction.    Sandra Castelan MD

## 2017-08-08 VITALS
SYSTOLIC BLOOD PRESSURE: 99 MMHG | TEMPERATURE: 97.2 F | OXYGEN SATURATION: 96 % | HEART RATE: 117 BPM | DIASTOLIC BLOOD PRESSURE: 64 MMHG | RESPIRATION RATE: 16 BRPM

## 2017-08-08 LAB — HGB BLD-MCNC: 8.1 G/DL (ref 11.7–15.7)

## 2017-08-08 PROCEDURE — 25000132 ZZH RX MED GY IP 250 OP 250 PS 637: Performed by: OBSTETRICS & GYNECOLOGY

## 2017-08-08 PROCEDURE — 99232 SBSQ HOSP IP/OBS MODERATE 35: CPT | Performed by: PHYSICIAN ASSISTANT

## 2017-08-08 PROCEDURE — 85018 HEMOGLOBIN: CPT | Performed by: PHYSICIAN ASSISTANT

## 2017-08-08 PROCEDURE — 25000132 ZZH RX MED GY IP 250 OP 250 PS 637: Performed by: PHYSICIAN ASSISTANT

## 2017-08-08 PROCEDURE — 36415 COLL VENOUS BLD VENIPUNCTURE: CPT | Performed by: PHYSICIAN ASSISTANT

## 2017-08-08 RX ORDER — POTASSIUM CHLORIDE 1500 MG/1
60 TABLET, EXTENDED RELEASE ORAL ONCE
Status: COMPLETED | OUTPATIENT
Start: 2017-08-08 | End: 2017-08-08

## 2017-08-08 RX ORDER — POTASSIUM CHLORIDE 1500 MG/1
40 TABLET, EXTENDED RELEASE ORAL ONCE
Status: DISCONTINUED | OUTPATIENT
Start: 2017-08-08 | End: 2017-08-08

## 2017-08-08 RX ADMIN — ACETAMINOPHEN 650 MG: 325 TABLET, FILM COATED ORAL at 10:07

## 2017-08-08 RX ADMIN — SENNOSIDES AND DOCUSATE SODIUM 1 TABLET: 8.6; 5 TABLET ORAL at 10:07

## 2017-08-08 RX ADMIN — IBUPROFEN 600 MG: 600 TABLET ORAL at 01:53

## 2017-08-08 RX ADMIN — POTASSIUM CHLORIDE 60 MEQ: 1500 TABLET, EXTENDED RELEASE ORAL at 11:10

## 2017-08-08 RX ADMIN — IBUPROFEN 600 MG: 600 TABLET ORAL at 10:07

## 2017-08-08 NOTE — ANESTHESIA POSTPROCEDURE EVALUATION
Patient: Bridgett Gillespie    * No procedures listed *    Diagnosis:* No pre-op diagnosis entered *  Diagnosis Additional Information: No value filed.    Anesthesia Type:  No value filed.    Note:  Anesthesia Post Evaluation    Patient location during evaluation: PACU  Patient participation: Able to fully participate in evaluation  Level of consciousness: awake  Pain management: adequate  Airway patency: patent  Cardiovascular status: acceptable  Respiratory status: acceptable  Hydration status: acceptable  PONV: none     Anesthetic complications: None          Last vitals:  Vitals:    08/07/17 0805 08/07/17 1100 08/07/17 1537   BP: 100/68 99/65 96/56   Pulse:   117   Resp: 16 18 18   Temp: 36.7  C (98.1  F)  36.5  C (97.7  F)   SpO2:            Electronically Signed By: Jenny Arredondo MD, MD  August 7, 2017  7:10 PM

## 2017-08-08 NOTE — PLAN OF CARE
Problem: Goal Outcome Summary  Goal: Goal Outcome Summary  Outcome: Improving  Vital signs stable, still slightly tachycardic. Patient voiding without difficulty. Able to ambulate in room free of dizziness. Taking Ibuprofen for pain management. Working on breastfeeding infant every 2-3 hours. Encouraged to call with questions/concerns. Will continue to monitor.

## 2017-08-08 NOTE — PROGRESS NOTES
"[facility] PROGRESS NOTE  DATA: SW following due to consult stating, \"Score of 10 on Machias Depression Scale.  Also score of 1 on question #10.  Per patient no current thoughts of harming herself but has had these thoughts in the past.\"  INTERVENTION: SW met with pt, pt's spouse Yue and pt's mother to introduce self/role, perform initial assessment, and discuss resources. Pt denies feelings of anxiety and/or depression. Pt states that she has had thoughts of harming herself in the past but this is not current. Pt is not currently involved with any mental health services; she does not feel she has a current need. Pt had a miscarriage in ; this is her first live born child. Pt is not employed and states she is looking forward to staying home with her , as his primary caregiver. Pt's mother plans to stay with pt to assist as long as needed. Pt is showing positive signs of bonding with her , per bed side nurse report. Pt reports that she has all of baby's equipment needs met and she has no needs at this time. Pt plans to f/u with her OB in 6 weeks and will discuss any concerns with mood at that time. SW provided pt with a resource packet re: PPD and other PP resources.  ASSESSMENT:  Pt and her spouse were pleasant and thankful of SWS visit. Pt would benefit from on-going family support re: adjustment to .    PLAN: SW will defer to pt's community physician for f/u, as needed. No concerns have been identified at this time. Resources for crisis support and other mental health resources/material were provided to pt and her family.     Dia Mack, LUIS E, LGSW      "

## 2017-08-08 NOTE — PROGRESS NOTES
[facility]    Hospitalist Progress Note      Assessment & Plan     Bridgett Gillespie is a 26-year-old female, postpartum day #2 for ,  who hospitalist was requested to see for persistent tachycardia in the postpartum period.     Asymptomatic Sinus Tachycardia: Thought to be secondary to volume depletion in the setting of ABL anemia and delivery. EGK with sinus tachycardia. No associated chest pain, dizziness, or SOB. No hypoxia. Low suspicion for PE  - HR improved this am after IV bolus. Patient remains asymptomatic  - Ok to d/c from IM standpoint. No further follow up needed    Hypokalemia  - K 3.2  - Will replace with KCL 60 meq x 1     Acute blood loss anemia  -  Hemoglobin of 13.5-- 8.6  - Management per primary service    DVT Prophylaxis: Ambulate every shift  Code Status: No Order    Disposition: Per primary service. Ok with d/c today per IM    Brynn Fabian    Interval History   Doing well this am. HR improved. Denies CP, dizziness or SOB.     Discussed with bedside RN    -Data reviewed today: I reviewed all new labs and imaging results over the last 24 hours. I personally reviewed no images or EKG's today.    Physical Exam   Temp: 97.2  F (36.2  C) Temp src: Oral BP: 99/64 Pulse: 117 Heart Rate: 102 Resp: 16        There were no vitals filed for this visit.  Vital Signs with Ranges  Temp:  [97.2  F (36.2  C)-97.8  F (36.6  C)] 97.2  F (36.2  C)  Pulse:  [117] 117  Heart Rate:  [102-117] 102  Resp:  [16-18] 16  BP: ()/(56-67) 99/64       Constitutional: Alert, resting comfortably in NAD  Respiratory: Normal effort, symmetric expansion, no crackles or wheezing  Cardiovascular: Tachycardic but regular, no murmurs.   GI: Normal bowels sounds  MSK: LE without edema. Dorsalis pedis pulse palpated bilaterally.   Skin/Integumen: Clear  Neuro: CN II-XII grossly intact  Psych:  Alert and oriented x 3. Normal affect      Medications     oxytocin in 0.9% NaCl 100 mL/hr (17 2230)      oxytocin in 0.9% NaCl       NO Rho (D) immune globulin (RhoGam) needed - mother Rh POSITIVE         potassium chloride  60 mEq Oral Once     senna-docusate  1-2 tablet Oral BID     measles, mumps and rubella vaccine  0.5 mL Subcutaneous Once     Tdap (tetanus-diphtheria-acell pertussis)  0.5 mL Intramuscular Once       Data     Recent Labs  Lab 08/07/17  1740 08/07/17  1217 08/07/17  0850 08/06/17  0850   WBC  --   --   --  11.0   HGB 8.6*  --  10.1* 13.5   MCV  --   --   --  95   PLT  --   --   --  143*   NA  --  141  --   --    POTASSIUM  --  3.2*  --   --    CHLORIDE  --  108  --   --    CO2  --  24  --   --    BUN  --  4*  --   --    CR  --  0.52  --   --    ANIONGAP  --  9  --   --    SEAN  --  8.4*  --   --    GLC  --  111*  --   --        No results found for this or any previous visit (from the past 24 hour(s)).

## 2017-08-08 NOTE — PLAN OF CARE
Problem: Goal Outcome Summary  Goal: Goal Outcome Summary  Patient meeting expected goals for this shift.  Remains tachycardic - MD aware.  Using ibuprofen for pain/comfort.  Independent in all self cares.  Working on breastfeeding  and  cares.   present at bedside and supportive.  Positive bonding behaviors observed.  Continue to monitor and notify MD as needed.

## 2017-08-08 NOTE — DISCHARGE SUMMARY
[facility] Discharge Summary    Bridgett Gillespie MRN# [id number]   Age: 26 year old YOB: 1990     Date of Admission:  2017  Date of Discharge::  2017  Admitting Physician:  iMchelle Nunez MD  Discharge Physician:  MIRA MENDEZ MD     Home clinic: [facility]          Admission Diagnoses:   Pregnancy  Indication for care in labor or delivery  Indication for care in labor or delivery  Labor and delivery, indication for care          Discharge Diagnosis:   Normal spontaneous vaginal delivery  Intrauterine pregnancy at 40+2 weeks gestation          Procedures:   Procedure(s): No additional procedures performed       No other procedures performed during this admission           Medications Prior to Admission:     Prescriptions Prior to Admission   Medication Sig Dispense Refill Last Dose     Prenatal Vit-Fe Fumarate-FA (PRENATAL MULTIVITAMIN PLUS IRON) 27-0.8 MG TABS per tablet Take 1 tablet by mouth daily   2017 at Unknown time     omega-3 acid ethyl esters (LOVAZA) 1 G capsule Take 2 g by mouth 2 times daily   2017 at Unknown time     VITAMIN D, CHOLECALCIFEROL, PO Take 2,000 Units by mouth daily   2017 at Unknown time             Discharge Medications:     Current Discharge Medication List      CONTINUE these medications which have NOT CHANGED    Details   Prenatal Vit-Fe Fumarate-FA (PRENATAL MULTIVITAMIN PLUS IRON) 27-0.8 MG TABS per tablet Take 1 tablet by mouth daily      omega-3 acid ethyl esters (LOVAZA) 1 G capsule Take 2 g by mouth 2 times daily      VITAMIN D, CHOLECALCIFEROL, PO Take 2,000 Units by mouth daily                   Consultations:   Consultation during this admission received from internal medicine          Brief History of Labor:   Presented in labor, received epidural and progressed well.  male 8# 12 oz. Some uterine atony requiring Pitocin, Methergine and Carboprost.           Hospital Course:   The patient's hospital course was remarkable for  tachycardia felt to be due to acute blood loss anemia.  On discharge, her pain was well controlled. Vaginal bleeding is similar to peak menstrual flow.  Voiding without difficulty.  Ambulating well and tolerating a normal diet.  No fever.  Breastfeeding well.  Infant is stable.  No bowel movement yet.*  She was discharged on post-partum day #2.    Post-partum hemoglobin:   Hemoglobin   Date Value Ref Range Status   08/07/2017 8.6 (L) 11.7 - 15.7 g/dL Final             Discharge Instructions and Follow-Up:   Discharge diet: Regular   Discharge activity: Activity as tolerated   Discharge follow-up: Follow up with me in 2 weeks   Wound care: Drink plenty of fluids  Take iron BID           Discharge Disposition:   Discharged to home      Attestation:  I have reviewed today's vital signs, notes, medications, labs and imaging.    MIRA MENDEZ MD

## 2017-08-08 NOTE — PLAN OF CARE
Problem: Goal Outcome Summary  Goal: Goal Outcome Summary  Outcome: Adequate for Discharge Date Met:  08/08/17  Pt. stable, tachycardia improved, pt. is asymptomatic. Hgb. recheck Is 8.1, Dr. Al wanted to be notified if pt. was less than 8.0. Will take iron suppl. at home. Will follow up with MD in 2 weeks. D/C instructions reviewed with pt. and she verbalized understanding. Will room in with baby who is staying for phototherapy.

## 2017-08-08 NOTE — LACTATION NOTE
Follow up visit. Pt using a shield. Assisted pt to latch infant several times to each breast with shield in place. Colostrum seen in the shield when infant comes off. Pt needing several reminders to not let infant pull back to a shallow latch on the shield. Importance of a deep latch explained several times. Pt and  state understanding but pt continues to let infant pull off shield without a full staff assist. Assisted pt with football hold helping to position with pillows and blanket rolls. Infant has not voided since birth. Peds aware. Pt started pumping this morning and infant was dropper fed similac x2 overnight. SNS not appropriate. Discussed increasing supplement amount today. Will discuss concerns with pediatrician and await further orders.

## 2017-08-08 NOTE — ANESTHESIA PROCEDURE NOTES
Peripheral nerve/Neuraxial procedure note : epidural catheter  Pre-Procedure  Performed by TORREY GUERRERO  Location: OB      Pre-Anesthestic Checklist: patient identified, IV checked, risks and benefits discussed, informed consent, monitors and equipment checked, pre-op evaluation and at physician/surgeon's request    Timeout  Correct Patient: Yes   Correct Procedure: Yes   Correct Site: Yes   Correct Laterality: N/A   Correct Position: Yes   Site Marked: N/A   .   Procedure Documentation    .    Procedure:    Epidural catheter.  Insertion Site:L3-4  (midline approach) Injection technique: LORT saline   Local skin infiltrated with 3 mL of 1% lidocaine.  LEONA at 5 cm     Patient Prep;mask, sterile gloves, povidone-iodine 7.5% surgical scrub, patient draped.  .  Needle: ToWatticsy needle Needle Gauge: 17.    Needle Length (Inches) 3.5  # of attempts: 1 and # of redirects: : 0. .   Catheter: 19 G . .  Catheter threaded easily  4 cm epidural space.  9 cm at skin.   .    Assessment/Narrative  Paresthesias: No.  .  .  Aspiration negative for heme or CSF  . Test dose of 3 mL lidocaine 1.5% w/ 1:200,000 epinephrine at. Test dose negative for signs of intravascular, subdural or intrathecal injection. Comments:  Pt tolerated well.   Immediately to supine with AMMY.   FHTs stable post-procedure.   No complications.

## 2017-08-09 LAB — INTERPRETATION ECG - MUSE: NORMAL
